# Patient Record
Sex: FEMALE | Race: WHITE | Employment: OTHER | URBAN - METROPOLITAN AREA
[De-identification: names, ages, dates, MRNs, and addresses within clinical notes are randomized per-mention and may not be internally consistent; named-entity substitution may affect disease eponyms.]

---

## 2021-02-12 ENCOUNTER — NEW REFERRAL (OUTPATIENT)
Dept: URBAN - METROPOLITAN AREA CLINIC 26 | Facility: CLINIC | Age: 86
End: 2021-02-12

## 2021-02-12 VITALS
WEIGHT: 155 LBS | DIASTOLIC BLOOD PRESSURE: 92 MMHG | HEART RATE: 71 BPM | BODY MASS INDEX: 25.83 KG/M2 | HEIGHT: 65 IN | SYSTOLIC BLOOD PRESSURE: 189 MMHG

## 2021-02-12 DIAGNOSIS — H40.9: ICD-10-CM

## 2021-02-12 DIAGNOSIS — H43.393: ICD-10-CM

## 2021-02-12 DIAGNOSIS — H31.412: ICD-10-CM

## 2021-02-12 DIAGNOSIS — H34.8110: ICD-10-CM

## 2021-02-12 PROCEDURE — 67028 INJECTION EYE DRUG: CPT

## 2021-02-12 PROCEDURE — J3301* KENALOG 4UNITS

## 2021-02-12 PROCEDURE — 76512 OPH US DX B-SCAN: CPT

## 2021-02-12 PROCEDURE — 92004 COMPRE OPH EXAM NEW PT 1/>: CPT

## 2021-02-12 PROCEDURE — 92250 FUNDUS PHOTOGRAPHY W/I&R: CPT

## 2021-02-12 PROCEDURE — 67515 INJECT/TREAT EYE SOCKET: CPT

## 2021-02-12 ASSESSMENT — TONOMETRY
OD_IOP_MMHG: 19
OS_IOP_MMHG: 09

## 2021-02-12 ASSESSMENT — VISUAL ACUITY: OD_SC: CF 1FT

## 2021-03-18 ENCOUNTER — FOLLOW UP (OUTPATIENT)
Dept: URBAN - METROPOLITAN AREA CLINIC 26 | Facility: CLINIC | Age: 86
End: 2021-03-18

## 2021-03-18 DIAGNOSIS — H31.412: ICD-10-CM

## 2021-03-18 DIAGNOSIS — H43.393: ICD-10-CM

## 2021-03-18 DIAGNOSIS — H40.9: ICD-10-CM

## 2021-03-18 DIAGNOSIS — H34.8110: ICD-10-CM

## 2021-03-18 PROCEDURE — 92014 COMPRE OPH EXAM EST PT 1/>: CPT

## 2021-03-18 PROCEDURE — 92250 FUNDUS PHOTOGRAPHY W/I&R: CPT

## 2021-03-18 ASSESSMENT — VISUAL ACUITY
OD_SC: 20/400
OS_CC: 20/40-2
OS_SC: 20/200
OD_CC: 20/200

## 2021-03-18 ASSESSMENT — TONOMETRY
OS_IOP_MMHG: 21
OD_IOP_MMHG: 23

## 2021-04-20 NOTE — PATIENT DISCUSSION
DRY EYES : Discussed with patient the importance of keeping the eye moist and the symptoms associated with dry eyes including blurry vision, tearing, burning, and elzbieta sensation. Advised patient to minimize use of any fans blowing directly on the face. Advised patient to continue with artificial tears 2-3 times daily.

## 2021-07-16 NOTE — PATIENT DISCUSSION
RETINA IS ATTACHED OU: PVD OU; NO HOLES OR TEARS SEEN ON DILATED EXAM TODAY. RETINAL DETACHMENT SIGNS AND SYMPTOMS REVIEWED.

## 2021-07-16 NOTE — PATIENT DISCUSSION
Macular Drusen:  I have explained the diagnosis of macular drusen and the role of drusen in development of macular degeneration. UV protection when outdoors and a nutritious diet, especially green leafy vegetables and fish to minimize the risk of developing macular degeneration. The patient was advised of the importance of annual dilated eye exams. Return for follow-up as scheduled.

## 2021-07-16 NOTE — PATIENT DISCUSSION
Continue: Artificial Tears (dextran 70-hypromellose): drops: 1 drop four times a day into both eyes.

## 2021-07-16 NOTE — PATIENT DISCUSSION
MACULAR DRUSEN OS: NO DEFINITE SIGNS OF AGE RELATED MACULAR DEGENERATION. RETURN FOR FOLLOW-UP AS SCHEDULED.

## 2022-03-18 NOTE — PATIENT DISCUSSION
After discussion of risks, benefits, and alternatives, including loss of vision, blindness, need for additional surgery and/or retinal detachment, patient elects to observe.

## 2022-03-18 NOTE — PATIENT DISCUSSION
The epiretinal membrane is causing a significant impairment in the patient’s vision that is impacting their activities of daily living to justify the consideration of surgery.

## 2022-07-09 ENCOUNTER — TELEPHONE ENCOUNTER (OUTPATIENT)
Dept: URBAN - METROPOLITAN AREA CLINIC 121 | Facility: CLINIC | Age: 87
End: 2022-07-09

## 2022-07-10 ENCOUNTER — TELEPHONE ENCOUNTER (OUTPATIENT)
Dept: URBAN - METROPOLITAN AREA CLINIC 121 | Facility: CLINIC | Age: 87
End: 2022-07-10
